# Patient Record
Sex: FEMALE
[De-identification: names, ages, dates, MRNs, and addresses within clinical notes are randomized per-mention and may not be internally consistent; named-entity substitution may affect disease eponyms.]

---

## 2021-06-23 ENCOUNTER — HOSPITAL ENCOUNTER (OUTPATIENT)
Dept: HOSPITAL 5 - SPVWC | Age: 76
Discharge: HOME | End: 2021-06-23
Attending: SURGERY
Payer: MEDICARE

## 2021-06-23 DIAGNOSIS — Z12.31: Primary | ICD-10-CM

## 2021-06-23 PROCEDURE — 77063 BREAST TOMOSYNTHESIS BI: CPT

## 2021-06-23 PROCEDURE — 77067 SCR MAMMO BI INCL CAD: CPT

## 2021-06-24 NOTE — MAMMOGRAPHY REPORT
DIGITAL SCREENING MAMMOGRAM WITH CAD, 6/24/2021



CLINICAL INFORMATION / INDICATION: Routine screening mammography. SCREENING WITH KYLEIGH



TECHNIQUE:  Digital bilateral 2D mammography was obtained in the craniocaudal and mediolateral obliqu
e projections. This examination was interpreted with the benefit of Computer-Aided Detection analysis
.



COMPARISON: 6/22/2020, 6/21/2019



FINDINGS: 



Breast Density: There are scattered areas of fibroglandular density.



No dominant mass, suspicious calcifications, or architectural distortion in either breast. 



Postsurgical scar is noted on the left.

 

IMPRESSION: No mammographic evidence of malignancy.



Follow up recommendation: Routine yearly



BI-RADS Category 2:  Benign.





-------------------------------------------------------------------------------------------

A "normal" or negative report should not discourage follow up or biopsy of a clinically significant f
inding.



A written summary of these findings will be mailed to the patient. The patient will be entered into a
 mammography reporting system which will generate a reminder letter for the patient's next appointmen
t at the appropriate interval.



The [employer] recommends yearly mammograms starting at age 40 and continuing as l
rose as a woman is in good health.  Breast MRI is recommended for women with an approximate 20-25% or 
greater lifetime risk of breast cancer, including women with a strong family history of breast or ova
eugenio cancer or who have been treated for Hodgkin's disease.



Signer Name: Carlito Glover MD 

Signed: 6/24/2021 8:52 AM

Workstation Name: [email or web address]

## 2021-06-24 NOTE — MAMMOGRAPHY REPORT
DIGITAL SCREENING MAMMOGRAM WITH CAD, 6/24/2021



CLINICAL INFORMATION / INDICATION: Routine screening mammography. SCREENING WITH KYLEIGH



TECHNIQUE:  Digital bilateral 2D mammography was obtained in the craniocaudal and mediolateral obliqu
e projections. This examination was interpreted with the benefit of Computer-Aided Detection analysis
.



COMPARISON: 6/22/2020, 6/21/2019



FINDINGS: 



Breast Density: There are scattered areas of fibroglandular density.



No dominant mass, suspicious calcifications, or architectural distortion in either breast. 



Postsurgical scar is noted on the left.

 

IMPRESSION: No mammographic evidence of malignancy.



Follow up recommendation: Routine yearly



BI-RADS Category 2:  Benign.





-------------------------------------------------------------------------------------------

A "normal" or negative report should not discourage follow up or biopsy of a clinically significant f
inding.



A written summary of these findings will be mailed to the patient. The patient will be entered into a
 mammography reporting system which will generate a reminder letter for the patient's next appointmen
t at the appropriate interval.



The [employer] recommends yearly mammograms starting at age 40 and continuing as l
rose as a woman is in good health.  Breast MRI is recommended for women with an approximate 20-25% or 
greater lifetime risk of breast cancer, including women with a strong family history of breast or ova
eugenio cancer or who have been treated for Hodgkin's disease.



Signer Name: Carlito Glover MD 

Signed: 6/24/2021 8:52 AM

Workstation Name: [email or web address]

## 2022-06-22 ENCOUNTER — HOSPITAL ENCOUNTER (OUTPATIENT)
Dept: HOSPITAL 5 - SPVWC | Age: 77
Discharge: HOME | End: 2022-06-22
Attending: FAMILY MEDICINE
Payer: MEDICARE

## 2022-06-22 DIAGNOSIS — Z12.31: Primary | ICD-10-CM

## 2022-06-22 PROCEDURE — 77067 SCR MAMMO BI INCL CAD: CPT

## 2022-06-24 NOTE — MAMMOGRAPHY REPORT
DIGITAL SCREENING MAMMOGRAM WITH CAD, 6/22/2022



CLINICAL INFORMATION / INDICATION: Routine screening mammography.



TECHNIQUE: Digital bilateral 2D mammography was obtained in the craniocaudal and mediolateral oblique
 projections. This examination was interpreted with the benefit of Computer-Aided Detection analysis.




COMPARISON: 5/31/2017 through 6/23/2021.



FINDINGS: 



Breast Density: There are scattered areas of fibroglandular density.



No dominant mass, suspicious calcifications, or architectural distortion in either breast. 



Left breast scar is again noted.

 

IMPRESSION: No mammographic evidence of malignancy.



Follow up recommendation: Routine yearly screening mammogram.



BI-RADS Category 2:  BENIGN.





-------------------------------------------------------------------------------------------

A "normal" or negative report should not discourage follow up or biopsy of a clinically significant f
inding.



A written summary of these findings will be mailed to the patient. The patient will be entered into a
 mammography reporting system which will generate a reminder letter for the patient's next appointmen
t at the appropriate interval.



The [employer] recommends yearly mammograms starting at age 40 and continuing as l
rose as a woman is in good health.  Breast MRI is recommended for women with an approximate 20-25% or 
greater lifetime risk of breast cancer, including women with a strong family history of breast or ova
eugenio cancer or who have been treated for Hodgkin's disease.



Signer Name: Augusto Hughes MD 

Signed: 6/24/2022 9:59 AM

Workstation Name: [email or web address]